# Patient Record
Sex: MALE | Race: WHITE | NOT HISPANIC OR LATINO | Employment: UNEMPLOYED | URBAN - METROPOLITAN AREA
[De-identification: names, ages, dates, MRNs, and addresses within clinical notes are randomized per-mention and may not be internally consistent; named-entity substitution may affect disease eponyms.]

---

## 2022-02-25 ENCOUNTER — OFFICE VISIT (OUTPATIENT)
Dept: FAMILY MEDICINE | Facility: CLINIC | Age: 32
End: 2022-02-25
Payer: COMMERCIAL

## 2022-02-25 VITALS
TEMPERATURE: 97.6 F | OXYGEN SATURATION: 99 % | HEART RATE: 101 BPM | DIASTOLIC BLOOD PRESSURE: 62 MMHG | SYSTOLIC BLOOD PRESSURE: 104 MMHG

## 2022-02-25 DIAGNOSIS — S01.332A COMPLICATION OF LEFT EAR PIERCING, INITIAL ENCOUNTER: Primary | ICD-10-CM

## 2022-02-25 PROCEDURE — 99203 OFFICE O/P NEW LOW 30 MIN: CPT

## 2022-02-25 RX ORDER — MUPIROCIN 20 MG/G
OINTMENT TOPICAL 3 TIMES DAILY
Qty: 22 G | Refills: 0 | Status: SHIPPED | OUTPATIENT
Start: 2022-02-25 | End: 2022-03-04

## 2022-02-25 NOTE — PROGRESS NOTES
Assessment & Plan     Complication of left ear piercing, initial encounter  Left piercing removed in clinic today with no complications.  Thick yellow drainage adhering to piercing and easing out of posterior left earlobe piercing hole    - mupirocin (BACTROBAN) 2 % external ointment; Apply topically 3 times daily for 7 days    20 minutes spent on the date of the encounter doing patient visit and documentation       See Patient Instructions    Return in about 1 week (around 3/4/2022), or if symptoms worsen or fail to improve.    [facility]    Khadijah Ta is a 31 year old who presents for the following health issues     HPI   31-year-old male presents to clinic today for possible infected piercing.  Had both earlobes pierced February 14, 2022.  Has been using cleaning solution to both ears, however in the past 7 days has noticed the the left lobe is swollen, painful and red.  Hurts to touch.  No fevers.      Review of Systems   Constitutional, HEENT, cardiovascular, pulmonary, gi and gu systems are negative, except as otherwise noted.      Objective    /62   Pulse 101   Temp 97.6  F (36.4  C) (Tympanic)   SpO2 99%   There is no height or weight on file to calculate BMI.  Physical Exam   GENERAL: healthy, alert and no distress  SKIN: Bilateral piercings to both earlobes, left earlobe with erythema swelling but still able to move earring freely.  Pus noted behind the piercing  LYMPH: no cervical adenopathy

## 2022-02-25 NOTE — PATIENT INSTRUCTIONS
Wash with soap and water daily. May use cleaning solution as well.  Use the antibiotic ointment as directed for 5-7 days  Recheck as needed